# Patient Record
Sex: FEMALE | Race: WHITE | NOT HISPANIC OR LATINO | URBAN - METROPOLITAN AREA
[De-identification: names, ages, dates, MRNs, and addresses within clinical notes are randomized per-mention and may not be internally consistent; named-entity substitution may affect disease eponyms.]

---

## 2019-12-17 PROBLEM — Z00.00 ENCOUNTER FOR PREVENTIVE HEALTH EXAMINATION: Status: ACTIVE | Noted: 2019-12-17

## 2021-07-16 ENCOUNTER — OUTPATIENT (OUTPATIENT)
Dept: OUTPATIENT SERVICES | Facility: HOSPITAL | Age: 61
LOS: 1 days | Discharge: HOME | End: 2021-07-16
Payer: COMMERCIAL

## 2021-07-16 VITALS
TEMPERATURE: 99 F | SYSTOLIC BLOOD PRESSURE: 138 MMHG | HEART RATE: 83 BPM | DIASTOLIC BLOOD PRESSURE: 78 MMHG | HEIGHT: 67 IN | RESPIRATION RATE: 16 BRPM | WEIGHT: 171.96 LBS | OXYGEN SATURATION: 99 %

## 2021-07-16 DIAGNOSIS — N83.209 UNSPECIFIED OVARIAN CYST, UNSPECIFIED SIDE: ICD-10-CM

## 2021-07-16 DIAGNOSIS — Z01.818 ENCOUNTER FOR OTHER PREPROCEDURAL EXAMINATION: ICD-10-CM

## 2021-07-16 DIAGNOSIS — R10.2 PELVIC AND PERINEAL PAIN: ICD-10-CM

## 2021-07-16 LAB
A1C WITH ESTIMATED AVERAGE GLUCOSE RESULT: 5.9 % — HIGH (ref 4–5.6)
ALBUMIN SERPL ELPH-MCNC: 5.1 G/DL — SIGNIFICANT CHANGE UP (ref 3.5–5.2)
ALP SERPL-CCNC: 63 U/L — SIGNIFICANT CHANGE UP (ref 30–115)
ALT FLD-CCNC: 14 U/L — SIGNIFICANT CHANGE UP (ref 0–41)
ANION GAP SERPL CALC-SCNC: 12 MMOL/L — SIGNIFICANT CHANGE UP (ref 7–14)
APTT BLD: 28.5 SEC — SIGNIFICANT CHANGE UP (ref 27–39.2)
AST SERPL-CCNC: 17 U/L — SIGNIFICANT CHANGE UP (ref 0–41)
BASOPHILS # BLD AUTO: 0.04 K/UL — SIGNIFICANT CHANGE UP (ref 0–0.2)
BASOPHILS NFR BLD AUTO: 0.6 % — SIGNIFICANT CHANGE UP (ref 0–1)
BILIRUB SERPL-MCNC: 0.8 MG/DL — SIGNIFICANT CHANGE UP (ref 0.2–1.2)
BLD GP AB SCN SERPL QL: SIGNIFICANT CHANGE UP
BUN SERPL-MCNC: 17 MG/DL — SIGNIFICANT CHANGE UP (ref 10–20)
CALCIUM SERPL-MCNC: 10.1 MG/DL — SIGNIFICANT CHANGE UP (ref 8.5–10.1)
CHLORIDE SERPL-SCNC: 101 MMOL/L — SIGNIFICANT CHANGE UP (ref 98–110)
CO2 SERPL-SCNC: 27 MMOL/L — SIGNIFICANT CHANGE UP (ref 17–32)
CREAT SERPL-MCNC: 0.8 MG/DL — SIGNIFICANT CHANGE UP (ref 0.7–1.5)
EOSINOPHIL # BLD AUTO: 0.13 K/UL — SIGNIFICANT CHANGE UP (ref 0–0.7)
EOSINOPHIL NFR BLD AUTO: 2 % — SIGNIFICANT CHANGE UP (ref 0–8)
ESTIMATED AVERAGE GLUCOSE: 123 MG/DL — HIGH (ref 68–114)
GLUCOSE SERPL-MCNC: 84 MG/DL — SIGNIFICANT CHANGE UP (ref 70–99)
HCT VFR BLD CALC: 40.7 % — SIGNIFICANT CHANGE UP (ref 37–47)
HGB BLD-MCNC: 13.1 G/DL — SIGNIFICANT CHANGE UP (ref 12–16)
IMM GRANULOCYTES NFR BLD AUTO: 0.3 % — SIGNIFICANT CHANGE UP (ref 0.1–0.3)
INR BLD: 1.05 RATIO — SIGNIFICANT CHANGE UP (ref 0.65–1.3)
LYMPHOCYTES # BLD AUTO: 2.19 K/UL — SIGNIFICANT CHANGE UP (ref 1.2–3.4)
LYMPHOCYTES # BLD AUTO: 32.9 % — SIGNIFICANT CHANGE UP (ref 20.5–51.1)
MCHC RBC-ENTMCNC: 29.2 PG — SIGNIFICANT CHANGE UP (ref 27–31)
MCHC RBC-ENTMCNC: 32.2 G/DL — SIGNIFICANT CHANGE UP (ref 32–37)
MCV RBC AUTO: 90.6 FL — SIGNIFICANT CHANGE UP (ref 81–99)
MONOCYTES # BLD AUTO: 0.55 K/UL — SIGNIFICANT CHANGE UP (ref 0.1–0.6)
MONOCYTES NFR BLD AUTO: 8.3 % — SIGNIFICANT CHANGE UP (ref 1.7–9.3)
NEUTROPHILS # BLD AUTO: 3.73 K/UL — SIGNIFICANT CHANGE UP (ref 1.4–6.5)
NEUTROPHILS NFR BLD AUTO: 55.9 % — SIGNIFICANT CHANGE UP (ref 42.2–75.2)
NRBC # BLD: 0 /100 WBCS — SIGNIFICANT CHANGE UP (ref 0–0)
PLATELET # BLD AUTO: 326 K/UL — SIGNIFICANT CHANGE UP (ref 130–400)
POTASSIUM SERPL-MCNC: 4.3 MMOL/L — SIGNIFICANT CHANGE UP (ref 3.5–5)
POTASSIUM SERPL-SCNC: 4.3 MMOL/L — SIGNIFICANT CHANGE UP (ref 3.5–5)
PROT SERPL-MCNC: 7.6 G/DL — SIGNIFICANT CHANGE UP (ref 6–8)
PROTHROM AB SERPL-ACNC: 12.1 SEC — SIGNIFICANT CHANGE UP (ref 9.95–12.87)
RBC # BLD: 4.49 M/UL — SIGNIFICANT CHANGE UP (ref 4.2–5.4)
RBC # FLD: 12.6 % — SIGNIFICANT CHANGE UP (ref 11.5–14.5)
SODIUM SERPL-SCNC: 140 MMOL/L — SIGNIFICANT CHANGE UP (ref 135–146)
WBC # BLD: 6.66 K/UL — SIGNIFICANT CHANGE UP (ref 4.8–10.8)
WBC # FLD AUTO: 6.66 K/UL — SIGNIFICANT CHANGE UP (ref 4.8–10.8)

## 2021-07-16 PROCEDURE — 93010 ELECTROCARDIOGRAM REPORT: CPT

## 2021-07-16 NOTE — H&P PST ADULT - REASON FOR ADMISSION
62 y/o female at PAST to prepare for TOTAL ABDOMINAL HYSTERECTOMY BILAT SALPINGOOOPHORECTOMY BY  Dr Gomes on 8/6/21 at [facility] under general anesthesia.

## 2021-07-16 NOTE — H&P PST ADULT - HISTORY OF PRESENT ILLNESS
62 y/o female at PAST to prepare for TOTAL ABDOMINAL HYSTERECTOMY BILAT SALPINGOOOPHORECTOMY BY  Dr Gomes on 8/6/21 at [facility] under general anesthesia.  Pt had low back pain and had MRI and was found to have pelvic cysts. Followed by GYN who recommended above procedure. Pt denies vaginal bleeding cramping or pain. She had LBP was treated with injections and oral steroids with improvement.  Pt without symptoms. Pt overall feels well.    Pt denies chest pain, palpitations, shortness of breath, dyspnea, or dysuria. Pt exercise tolerance: 2 blocks/ flights of stairs without SOB.  The patient  denies any covid signs or symptoms, denies  recent exposure and denies  testing  positive in the past. She has completed 2 vaccines at work  The patient was advised to self quarantine from now and following pre op covid test until  day of procedure.    Anesthesia Alert  NO--Difficult Airway  NO--History of neck surgery or radiation  NO--Limited ROM of neck  NO--History of Malignant hyperthermia  NO--No personal or family history of Pseudocholinesterase deficiency.  NO--Prior Anesthesia Complication  NO--Latex Allergy  NO--Loose teeth  NO--History of Rheumatoid Arthritis  NO--GINI  NO--Bleeding risk_____

## 2021-07-16 NOTE — H&P PST ADULT - NSICDXFAMILYHX_GEN_ALL_CORE_FT
FAMILY HISTORY:  Father  Still living? Unknown  FH: Parkinson's disease, Age at diagnosis: Age Unknown    Mother  Still living? Unknown  Family history of CVA, Age at diagnosis: Age Unknown  FH: colon cancer, Age at diagnosis: Age Unknown

## 2021-08-03 ENCOUNTER — OUTPATIENT (OUTPATIENT)
Dept: OUTPATIENT SERVICES | Facility: HOSPITAL | Age: 61
LOS: 1 days | Discharge: HOME | End: 2021-08-03

## 2021-08-03 DIAGNOSIS — Z11.59 ENCOUNTER FOR SCREENING FOR OTHER VIRAL DISEASES: ICD-10-CM

## 2021-08-03 PROBLEM — M51.26 OTHER INTERVERTEBRAL DISC DISPLACEMENT, LUMBAR REGION: Chronic | Status: ACTIVE | Noted: 2021-07-16

## 2021-08-03 PROBLEM — J30.2 OTHER SEASONAL ALLERGIC RHINITIS: Chronic | Status: ACTIVE | Noted: 2021-07-16

## 2021-08-06 ENCOUNTER — INPATIENT (INPATIENT)
Facility: HOSPITAL | Age: 61
LOS: 1 days | Discharge: HOME | End: 2021-08-08
Attending: OBSTETRICS & GYNECOLOGY | Admitting: OBSTETRICS & GYNECOLOGY
Payer: COMMERCIAL

## 2021-08-06 ENCOUNTER — RESULT REVIEW (OUTPATIENT)
Age: 61
End: 2021-08-06

## 2021-08-06 VITALS
SYSTOLIC BLOOD PRESSURE: 134 MMHG | HEIGHT: 67 IN | WEIGHT: 171.96 LBS | OXYGEN SATURATION: 100 % | RESPIRATION RATE: 18 BRPM | HEART RATE: 83 BPM | DIASTOLIC BLOOD PRESSURE: 71 MMHG | TEMPERATURE: 99 F

## 2021-08-06 LAB
ABO RH CONFIRMATION: SIGNIFICANT CHANGE UP
GLUCOSE BLDC GLUCOMTR-MCNC: 185 MG/DL — HIGH (ref 70–99)
GLUCOSE BLDC GLUCOMTR-MCNC: 93 MG/DL — SIGNIFICANT CHANGE UP (ref 70–99)

## 2021-08-06 PROCEDURE — 88305 TISSUE EXAM BY PATHOLOGIST: CPT | Mod: 26

## 2021-08-06 PROCEDURE — 88307 TISSUE EXAM BY PATHOLOGIST: CPT | Mod: 26

## 2021-08-06 PROCEDURE — 58720 REMOVAL OF OVARY/TUBE(S): CPT

## 2021-08-06 PROCEDURE — 88305 TISSUE EXAM BY PATHOLOGIST: CPT | Mod: 26,59

## 2021-08-06 PROCEDURE — 88112 CYTOPATH CELL ENHANCE TECH: CPT | Mod: 26

## 2021-08-06 RX ORDER — ENOXAPARIN SODIUM 100 MG/ML
40 INJECTION SUBCUTANEOUS DAILY
Refills: 0 | Status: DISCONTINUED | OUTPATIENT
Start: 2021-08-06 | End: 2021-08-08

## 2021-08-06 RX ORDER — HYDROMORPHONE HYDROCHLORIDE 2 MG/ML
30 INJECTION INTRAMUSCULAR; INTRAVENOUS; SUBCUTANEOUS
Refills: 0 | Status: DISCONTINUED | OUTPATIENT
Start: 2021-08-06 | End: 2021-08-07

## 2021-08-06 RX ORDER — SIMETHICONE 80 MG/1
80 TABLET, CHEWABLE ORAL EVERY 6 HOURS
Refills: 0 | Status: DISCONTINUED | OUTPATIENT
Start: 2021-08-06 | End: 2021-08-08

## 2021-08-06 RX ORDER — HYDROMORPHONE HYDROCHLORIDE 2 MG/ML
0.5 INJECTION INTRAMUSCULAR; INTRAVENOUS; SUBCUTANEOUS
Refills: 0 | Status: DISCONTINUED | OUTPATIENT
Start: 2021-08-06 | End: 2021-08-06

## 2021-08-06 RX ORDER — ONDANSETRON 8 MG/1
8 TABLET, FILM COATED ORAL EVERY 8 HOURS
Refills: 0 | Status: DISCONTINUED | OUTPATIENT
Start: 2021-08-06 | End: 2021-08-08

## 2021-08-06 RX ORDER — ACETAMINOPHEN 500 MG
1000 TABLET ORAL EVERY 6 HOURS
Refills: 0 | Status: DISCONTINUED | OUTPATIENT
Start: 2021-08-06 | End: 2021-08-08

## 2021-08-06 RX ORDER — ONDANSETRON 8 MG/1
4 TABLET, FILM COATED ORAL ONCE
Refills: 0 | Status: COMPLETED | OUTPATIENT
Start: 2021-08-06 | End: 2021-08-06

## 2021-08-06 RX ORDER — KETOROLAC TROMETHAMINE 30 MG/ML
30 SYRINGE (ML) INJECTION EVERY 8 HOURS
Refills: 0 | Status: DISCONTINUED | OUTPATIENT
Start: 2021-08-06 | End: 2021-08-07

## 2021-08-06 RX ORDER — IBUPROFEN 200 MG
600 TABLET ORAL EVERY 6 HOURS
Refills: 0 | Status: COMPLETED | OUTPATIENT
Start: 2021-08-06 | End: 2022-07-05

## 2021-08-06 RX ORDER — GABAPENTIN 400 MG/1
300 CAPSULE ORAL EVERY 8 HOURS
Refills: 0 | Status: DISCONTINUED | OUTPATIENT
Start: 2021-08-06 | End: 2021-08-08

## 2021-08-06 RX ORDER — ACETAMINOPHEN 500 MG
1000 TABLET ORAL EVERY 6 HOURS
Refills: 0 | Status: COMPLETED | OUTPATIENT
Start: 2021-08-06 | End: 2022-07-05

## 2021-08-06 RX ORDER — SENNA PLUS 8.6 MG/1
1 TABLET ORAL AT BEDTIME
Refills: 0 | Status: DISCONTINUED | OUTPATIENT
Start: 2021-08-06 | End: 2021-08-08

## 2021-08-06 RX ORDER — CEFAZOLIN SODIUM 1 G
2000 VIAL (EA) INJECTION EVERY 8 HOURS
Refills: 0 | Status: COMPLETED | OUTPATIENT
Start: 2021-08-06 | End: 2021-08-07

## 2021-08-06 RX ORDER — HYDROMORPHONE HYDROCHLORIDE 2 MG/ML
1 INJECTION INTRAMUSCULAR; INTRAVENOUS; SUBCUTANEOUS
Refills: 0 | Status: DISCONTINUED | OUTPATIENT
Start: 2021-08-06 | End: 2021-08-06

## 2021-08-06 RX ORDER — OXYCODONE HYDROCHLORIDE 5 MG/1
5 TABLET ORAL
Refills: 0 | Status: DISCONTINUED | OUTPATIENT
Start: 2021-08-06 | End: 2021-08-06

## 2021-08-06 RX ORDER — SODIUM CHLORIDE 9 MG/ML
1000 INJECTION, SOLUTION INTRAVENOUS
Refills: 0 | Status: DISCONTINUED | OUTPATIENT
Start: 2021-08-06 | End: 2021-08-06

## 2021-08-06 RX ADMIN — Medication 100 MILLIGRAM(S): at 16:00

## 2021-08-06 RX ADMIN — ENOXAPARIN SODIUM 40 MILLIGRAM(S): 100 INJECTION SUBCUTANEOUS at 21:10

## 2021-08-06 RX ADMIN — HYDROMORPHONE HYDROCHLORIDE 1 MILLIGRAM(S): 2 INJECTION INTRAMUSCULAR; INTRAVENOUS; SUBCUTANEOUS at 14:46

## 2021-08-06 RX ADMIN — GABAPENTIN 300 MILLIGRAM(S): 400 CAPSULE ORAL at 21:10

## 2021-08-06 RX ADMIN — Medication 1000 MILLIGRAM(S): at 17:03

## 2021-08-06 RX ADMIN — HYDROMORPHONE HYDROCHLORIDE 1 MILLIGRAM(S): 2 INJECTION INTRAMUSCULAR; INTRAVENOUS; SUBCUTANEOUS at 16:13

## 2021-08-06 RX ADMIN — HYDROMORPHONE HYDROCHLORIDE 30 MILLILITER(S): 2 INJECTION INTRAMUSCULAR; INTRAVENOUS; SUBCUTANEOUS at 15:43

## 2021-08-06 RX ADMIN — Medication 100 MILLIGRAM(S): at 23:28

## 2021-08-06 RX ADMIN — ONDANSETRON 4 MILLIGRAM(S): 8 TABLET, FILM COATED ORAL at 14:46

## 2021-08-06 RX ADMIN — SODIUM CHLORIDE 75 MILLILITER(S): 9 INJECTION, SOLUTION INTRAVENOUS at 14:56

## 2021-08-06 NOTE — ASU PREOP CHECKLIST - AICD PRESENT
no

## 2021-08-06 NOTE — BRIEF OPERATIVE NOTE - NSICDXBRIEFPOSTOP_GEN_ALL_CORE_FT
POST-OP DIAGNOSIS:  Left ovarian cyst 06-Aug-2021 14:45:33 frozen section showed endometrioma, final pathology pending Aliya Andino

## 2021-08-06 NOTE — BRIEF OPERATIVE NOTE - OPERATION/FINDINGS
Exam under anesthesia normal external female genitalia. Uterus small, anteverted and mobile. 10x8cm ovarian cystic mass, multiloculated and adherent to left pelvic sidewall. 2mm nodule on peritoneum left pelvic sidewall. Normal appearing right fallopian tube and ovary. Exam under anesthesia normal external female genitalia. Uterus small, anteverted and mobile. 10x8cm ovarian cystic mass, multiloculated and adherent to left pelvic sidewall. 2mm nodule on peritoneum left pelvic sidewall. Normal appearing right fallopian tube and ovary. Intraop Gyn Onc consult given left adnexa adherent to pelvic sidewall. Exam under anesthesia normal external female genitalia. Uterus small, anteverted and mobile. 10x8cm ovarian cystic mass, multiloculated and adherent to left pelvic sidewall. 2mm nodule on peritoneum left pelvic sidewall. Normal appearing right fallopian tube and ovary. Intraop Gyn Onc consult (Dr. Tristan) given left adnexa adherent to pelvic sidewall.

## 2021-08-06 NOTE — CHART NOTE - NSCHARTNOTEFT_GEN_A_CORE
PACU ANESTHESIA ADMISSION NOTE      Procedure: total abdominal hystrectomy  Post op diagnosis:  Ovarian mass    ____  Intubated  TV:______       Rate: ______      FiO2: ______    _x___  Patent Airway    _x___  Full return of protective reflexes    _x___  Full recovery from anesthesia / back to baseline status    Vitals  SPO2:-100% on 2l   HR:-98  RR:-12  B.P:-122/85  TEMP:-99.9    Mental Status:  _x___ Awake   ___x_ Alert   _____ Drowsy   _____ Sedated    Nausea/Vomiting:  _x___  NO       ______Yes,   See Post - Op Orders         Pain Scale (0-10):  __0___    Treatment: _x___ None    __x__ See Post - Op/PCA Orders    Post - Operative Fluids:   ___ Oral   ____x See Post - Op Orders    Plan: Discharge:   ___Home       ___x__Floor     _____Critical Care    _____  Other:_________________    Comments:  Report endorsed to RN in pacu  Vitals stable  No anesthesia issues or complications noted.  Discharge to patient to floor when criteria met.

## 2021-08-06 NOTE — PROGRESS NOTE ADULT - ASSESSMENT
60 yo G0, w/ 10cm left ovarian mass, s/p KRISTEN/BSO, s/p intraop consult by gynonc for excision of left pelvic sidewall nodule, frozen endometrioma, EBL 100cc, POD0, recovering well.    - pain management with dilaudid PCA + ERAS protocol  - monitor UO, remove garcia in AM  - monitor vitals   - encourage ambulation after PCA discontinued, incentive spirometry  - clear liquid diet, IV hydration  - SCDs and lovenox for DVT ppx  - ancef x24hrs postop  - AM labs ordered    Dr. Gomes aware.

## 2021-08-06 NOTE — PROGRESS NOTE ADULT - SUBJECTIVE AND OBJECTIVE BOX
PGY 4 Progress Note    Subjective: Patient seen and examined at bedside. Doing well, no complaints. No significant abdominal pain or vaginal bleeding. Denies fever, chills, CP, SOB, N/V, LE pain. She has not yet ambulated, no flatus or BM, tolerating clear liquids.     Physical exam:    Vital Signs Last 24 Hrs  T(F): 97.9 (06 Aug 2021 21:07), Max: 100.1 (06 Aug 2021 14:15)  HR: 87 (06 Aug 2021 21:07) (80 - 111)  BP: 113/68 (06 Aug 2021 21:07) (113/68 - 134/71)  RR: 16 (06 Aug 2021 21:07) (9 - 18)  SpO2: 97% (06 Aug 2021 21:07) (95% - 100%)    Gen: NAD  CVS: s1s2, rrr  Lungs: ctab, no r/r/w  Abdomen: Soft, appropriately tender, minimal distension, no r/r/g  Incision: well healing Pfannenstiel skin incision, c/d/i  Pelvic: deferred, no bleeding on nba  Ext: No calf tenderness b/l LE    Diet: clear liquids  meds:   acetaminophen   Tablet ..   1000 milliGRAM(s) Oral (08-06-21 @ 17:03)    ceFAZolin   IVPB   100 mL/Hr IV Intermittent (08-06-21 @ 16:00)    enoxaparin Injectable   40 milliGRAM(s) SubCutaneous (08-06-21 @ 21:10)    gabapentin   300 milliGRAM(s) Oral (08-06-21 @ 21:10)    HYDROmorphone  Injectable   1 milliGRAM(s) IV Push (08-06-21 @ 14:46)    lactated ringers.   75 mL/Hr IV Continuous (08-06-21 @ 14:18)    ondansetron Injectable   4 milliGRAM(s) IV Push (08-06-21 @ 14:46)        LABS:

## 2021-08-06 NOTE — BRIEF OPERATIVE NOTE - SPECIMENS
1. left adnexal with ovarian mass 2. uterus, cervix and right fallopian tube and ovary 3. left pelvic sidewall peritoneal nodule

## 2021-08-07 ENCOUNTER — TRANSCRIPTION ENCOUNTER (OUTPATIENT)
Age: 61
End: 2021-08-07

## 2021-08-07 LAB
ALBUMIN SERPL ELPH-MCNC: 3.7 G/DL — SIGNIFICANT CHANGE UP (ref 3.5–5.2)
ALBUMIN SERPL ELPH-MCNC: 3.8 G/DL — SIGNIFICANT CHANGE UP (ref 3.5–5.2)
ALP SERPL-CCNC: 48 U/L — SIGNIFICANT CHANGE UP (ref 30–115)
ALP SERPL-CCNC: 51 U/L — SIGNIFICANT CHANGE UP (ref 30–115)
ALT FLD-CCNC: 12 U/L — SIGNIFICANT CHANGE UP (ref 0–41)
ALT FLD-CCNC: 14 U/L — SIGNIFICANT CHANGE UP (ref 0–41)
ANION GAP SERPL CALC-SCNC: 14 MMOL/L — SIGNIFICANT CHANGE UP (ref 7–14)
ANION GAP SERPL CALC-SCNC: 7 MMOL/L — SIGNIFICANT CHANGE UP (ref 7–14)
AST SERPL-CCNC: 16 U/L — SIGNIFICANT CHANGE UP (ref 0–41)
AST SERPL-CCNC: 24 U/L — SIGNIFICANT CHANGE UP (ref 0–41)
BILIRUB SERPL-MCNC: 1 MG/DL — SIGNIFICANT CHANGE UP (ref 0.2–1.2)
BILIRUB SERPL-MCNC: 1.4 MG/DL — HIGH (ref 0.2–1.2)
BUN SERPL-MCNC: 11 MG/DL — SIGNIFICANT CHANGE UP (ref 10–20)
BUN SERPL-MCNC: 13 MG/DL — SIGNIFICANT CHANGE UP (ref 10–20)
CALCIUM SERPL-MCNC: 8.8 MG/DL — SIGNIFICANT CHANGE UP (ref 8.5–10.1)
CALCIUM SERPL-MCNC: 9 MG/DL — SIGNIFICANT CHANGE UP (ref 8.5–10.1)
CHLORIDE SERPL-SCNC: 103 MMOL/L — SIGNIFICANT CHANGE UP (ref 98–110)
CHLORIDE SERPL-SCNC: 98 MMOL/L — SIGNIFICANT CHANGE UP (ref 98–110)
CO2 SERPL-SCNC: 22 MMOL/L — SIGNIFICANT CHANGE UP (ref 17–32)
CO2 SERPL-SCNC: 29 MMOL/L — SIGNIFICANT CHANGE UP (ref 17–32)
COVID-19 SPIKE DOMAIN AB INTERP: POSITIVE
COVID-19 SPIKE DOMAIN ANTIBODY RESULT: >250 U/ML — HIGH
CREAT SERPL-MCNC: 0.7 MG/DL — SIGNIFICANT CHANGE UP (ref 0.7–1.5)
CREAT SERPL-MCNC: 0.7 MG/DL — SIGNIFICANT CHANGE UP (ref 0.7–1.5)
GLUCOSE SERPL-MCNC: 120 MG/DL — HIGH (ref 70–99)
GLUCOSE SERPL-MCNC: 99 MG/DL — SIGNIFICANT CHANGE UP (ref 70–99)
HCT VFR BLD CALC: 34.8 % — LOW (ref 37–47)
HGB BLD-MCNC: 10.9 G/DL — LOW (ref 12–16)
MAGNESIUM SERPL-MCNC: 2 MG/DL — SIGNIFICANT CHANGE UP (ref 1.8–2.4)
MCHC RBC-ENTMCNC: 29 PG — SIGNIFICANT CHANGE UP (ref 27–31)
MCHC RBC-ENTMCNC: 31.3 G/DL — LOW (ref 32–37)
MCV RBC AUTO: 92.6 FL — SIGNIFICANT CHANGE UP (ref 81–99)
NRBC # BLD: 0 /100 WBCS — SIGNIFICANT CHANGE UP (ref 0–0)
PHOSPHATE SERPL-MCNC: 4.5 MG/DL — SIGNIFICANT CHANGE UP (ref 2.1–4.9)
PLATELET # BLD AUTO: 276 K/UL — SIGNIFICANT CHANGE UP (ref 130–400)
POTASSIUM SERPL-MCNC: 4.5 MMOL/L — SIGNIFICANT CHANGE UP (ref 3.5–5)
POTASSIUM SERPL-MCNC: 5.1 MMOL/L — HIGH (ref 3.5–5)
POTASSIUM SERPL-SCNC: 4.5 MMOL/L — SIGNIFICANT CHANGE UP (ref 3.5–5)
POTASSIUM SERPL-SCNC: 5.1 MMOL/L — HIGH (ref 3.5–5)
PROT SERPL-MCNC: 5.7 G/DL — LOW (ref 6–8)
PROT SERPL-MCNC: 6 G/DL — SIGNIFICANT CHANGE UP (ref 6–8)
RBC # BLD: 3.76 M/UL — LOW (ref 4.2–5.4)
RBC # FLD: 13 % — SIGNIFICANT CHANGE UP (ref 11.5–14.5)
SARS-COV-2 IGG+IGM SERPL QL IA: >250 U/ML — HIGH
SARS-COV-2 IGG+IGM SERPL QL IA: POSITIVE
SODIUM SERPL-SCNC: 134 MMOL/L — LOW (ref 135–146)
SODIUM SERPL-SCNC: 139 MMOL/L — SIGNIFICANT CHANGE UP (ref 135–146)
WBC # BLD: 10.48 K/UL — SIGNIFICANT CHANGE UP (ref 4.8–10.8)
WBC # FLD AUTO: 10.48 K/UL — SIGNIFICANT CHANGE UP (ref 4.8–10.8)

## 2021-08-07 RX ADMIN — GABAPENTIN 300 MILLIGRAM(S): 400 CAPSULE ORAL at 05:22

## 2021-08-07 RX ADMIN — ENOXAPARIN SODIUM 40 MILLIGRAM(S): 100 INJECTION SUBCUTANEOUS at 12:04

## 2021-08-07 RX ADMIN — Medication 1000 MILLIGRAM(S): at 17:41

## 2021-08-07 RX ADMIN — Medication 30 MILLIGRAM(S): at 05:22

## 2021-08-07 RX ADMIN — Medication 30 MILLIGRAM(S): at 14:23

## 2021-08-07 RX ADMIN — GABAPENTIN 300 MILLIGRAM(S): 400 CAPSULE ORAL at 21:26

## 2021-08-07 RX ADMIN — Medication 1000 MILLIGRAM(S): at 12:04

## 2021-08-07 RX ADMIN — GABAPENTIN 300 MILLIGRAM(S): 400 CAPSULE ORAL at 14:23

## 2021-08-07 RX ADMIN — Medication 100 MILLIGRAM(S): at 10:33

## 2021-08-07 RX ADMIN — Medication 1000 MILLIGRAM(S): at 23:10

## 2021-08-07 RX ADMIN — Medication 1000 MILLIGRAM(S): at 05:22

## 2021-08-07 NOTE — PROGRESS NOTE ADULT - SUBJECTIVE AND OBJECTIVE BOX
PGY 3 Note    Patient examined at bedside, pain well controlled on PO/IV medications.  Denies fevers/chills, HA/N/V, CP/SOB/palpitations, abdominal pain, vaginal bleeding, hematuria/dysuria, constipation/diarrhea. Tolerating clear/full/regular diet, passing/no flatus. Not Ambulating. Using incentive spirometry.     T(F): 98.6 (08-07-21 @ 05:00), Max: 100.1 (08-06-21 @ 14:15)  HR: 84 (08-07-21 @ 05:00) (80 - 111)  BP: 114/61 (08-07-21 @ 05:00) (105/56 - 133/70)  RR: 18 (08-07-21 @ 05:00) (9 - 18)  SpO2: 98% (08-07-21 @ 05:00) (95% - 100%)    I&O's Summary    06 Aug 2021 07:01  -  07 Aug 2021 07:00  --------------------------------------------------------  IN: 665 mL / OUT: 1115 mL / NET: -450 mL      Urine:   08-06-21 @ 07:01  -  08-07-21 @ 07:00  --------------------------------------------------------  IN: 0 mL / OUT: 585 mL / NET: -585 mL      NG Tube:     Drain:     CAPILLARY BLOOD GLUCOSE      POCT Blood Glucose.: 185 mg/dL (06 Aug 2021 14:54)      Physical Exam:  General: AAOx3. NAD  CVS: RRR. Nl S1S2  Lungs: CTAB  Abdomen: soft, non-tender, non-distended, +BSx4  Incision: C/D/I, no erythema, no draining  VE: deferred, no bleeding on pad/chux  Ext: No edema. SCDs in place    Labs:             10.9<L>  10.48 )-----------( 276      ( 08-07 @ 04:58 )             34.8<L>     08-07    139  |  103  |  11  ----------------------------<  120<H>  5.1<H>   |  29  |  0.7    Ca    9.0      07 Aug 2021 04:58  Phos  4.5     08-07  Mg     2.0     08-07    TPro  5.7<L>  /  Alb  3.7  /  TBili  1.0  /  DBili  x   /  AST  16  /  ALT  12  /  AlkPhos  48  08-07            Trend:             10.9<L>  10.48 )-----------( 276      ( 08-07 @ 04:58 )             34.8<L>        Creatinine, Serum: 0.7 (08-07)      Medications:  MEDICATIONS  (STANDING):  acetaminophen   Tablet .. 1000 milliGRAM(s) Oral every 6 hours  ceFAZolin   IVPB 2000 milliGRAM(s) IV Intermittent every 8 hours  enoxaparin Injectable 40 milliGRAM(s) SubCutaneous daily  gabapentin 300 milliGRAM(s) Oral every 8 hours  HYDROmorphone PCA (1 mG/mL) 30 milliLiter(s) PCA Continuous PCA Continuous  ketorolac   Injectable 30 milliGRAM(s) IV Push every 8 hours    MEDICATIONS  (PRN):  acetaminophen   Tablet .. 1000 milliGRAM(s) Oral every 6 hours PRN Mild Pain (1 - 3)  ibuprofen  Tablet. 600 milliGRAM(s) Oral every 6 hours PRN Mild Pain (1 - 3)  ondansetron    Tablet 8 milliGRAM(s) Oral every 8 hours PRN Nausea and/or Vomiting  senna 1 Tablet(s) Oral at bedtime PRN Constipation  simethicone 80 milliGRAM(s) Chew every 6 hours PRN Gas      A/P:  61y  POD   GI:  DVT prophylaxis:  Neuro:  Cardio:   Pulm:   ID:  Endo:      Will inform        PGY 3 Note    Patient examined at bedside, pain well controlled on dilaudid PCA. Reports pain near her abdominal incision when moving. Denies fevers/chills, HA/N/V, CP/SOB/palpitations, vaginal bleeding, hematuria/dysuria, constipation/diarrhea. Tolerating clear diet, passing no flatus. Not Ambulating. Using incentive spirometry.     T(F): 98.6 (08-07-21 @ 05:00), Max: 100.1 (08-06-21 @ 14:15)  HR: 84 (08-07-21 @ 05:00) (80 - 111)  BP: 114/61 (08-07-21 @ 05:00) (105/56 - 133/70)  RR: 18 (08-07-21 @ 05:00) (9 - 18)  SpO2: 98% (08-07-21 @ 05:00) (95% - 100%)    I&O's Summary    06 Aug 2021 07:01  -  07 Aug 2021 07:00  --------------------------------------------------------  IN: 665 mL / OUT: 1115 mL / NET: -450 mL      Urine:   08-06-21 @ 07:01  -  08-07-21 @ 07:00  --------------------------------------------------------  IN: 0 mL / OUT: 585 mL / NET: -585 mL      UO: 300cc (7580-2520), adequate      POCT Blood Glucose.: 185 mg/dL (06 Aug 2021 14:54)      Physical Exam:  General: AAOx3. NAD  CVS: RRR. Nl S1S2  Lungs: CTAB  Abdomen: soft, non-tender, non-distended, +BSx4  Incision: pfannenstial incision C/D/I with steri-stripes in place, no erythema, no draining  VE: deferred, no bleeding on pad/chux  Ext: No edema. SCDs in place    Labs:             10.9<L>  10.48 )-----------( 276      ( 08-07 @ 04:58 )             34.8<L>     08-07    139  |  103  |  11  ----------------------------<  120<H>  5.1<H>   |  29  |  0.7    Ca    9.0      07 Aug 2021 04:58  Phos  4.5     08-07  Mg     2.0     08-07    TPro  5.7<L>  /  Alb  3.7  /  TBili  1.0  /  DBili  x   /  AST  16  /  ALT  12  /  AlkPhos  48  08-07            Trend:             10.9<L>  10.48 )-----------( 276      ( 08-07 @ 04:58 )             34.8<L>        Creatinine, Serum: 0.7 (08-07)      Medications:  MEDICATIONS  (STANDING):  acetaminophen   Tablet .. 1000 milliGRAM(s) Oral every 6 hours  ceFAZolin   IVPB 2000 milliGRAM(s) IV Intermittent every 8 hours  enoxaparin Injectable 40 milliGRAM(s) SubCutaneous daily  gabapentin 300 milliGRAM(s) Oral every 8 hours  HYDROmorphone PCA (1 mG/mL) 30 milliLiter(s) PCA Continuous PCA Continuous  ketorolac   Injectable 30 milliGRAM(s) IV Push every 8 hours    MEDICATIONS  (PRN):  acetaminophen   Tablet .. 1000 milliGRAM(s) Oral every 6 hours PRN Mild Pain (1 - 3)  ibuprofen  Tablet. 600 milliGRAM(s) Oral every 6 hours PRN Mild Pain (1 - 3)  ondansetron    Tablet 8 milliGRAM(s) Oral every 8 hours PRN Nausea and/or Vomiting  senna 1 Tablet(s) Oral at bedtime PRN Constipation  simethicone 80 milliGRAM(s) Chew every 6 hours PRN Gas      A/P:  61y  POD   GI:  DVT prophylaxis:  Neuro:  Cardio:   Pulm:   ID:  Endo:      Will inform        PGY 3 Note    Patient examined at bedside, pain well controlled on dilaudid PCA. Reports pain near her abdominal incision when moving. Denies fevers/chills, HA/N/V, CP/SOB/palpitations, vaginal bleeding, hematuria/dysuria, constipation/diarrhea. Tolerating clear diet, passing no flatus. Not Ambulating. Using incentive spirometry.     T(F): 98.6 (08-07-21 @ 05:00), Max: 100.1 (08-06-21 @ 14:15)  HR: 84 (08-07-21 @ 05:00) (80 - 111)  BP: 114/61 (08-07-21 @ 05:00) (105/56 - 133/70)  RR: 18 (08-07-21 @ 05:00) (9 - 18)  SpO2: 98% (08-07-21 @ 05:00) (95% - 100%)    I&O's Summary    06 Aug 2021 07:01  -  07 Aug 2021 07:00  --------------------------------------------------------  IN: 665 mL / OUT: 1115 mL / NET: -450 mL      Urine:   08-06-21 @ 07:01  -  08-07-21 @ 07:00  --------------------------------------------------------  IN: 0 mL / OUT: 585 mL / NET: -585 mL      UO: 300cc (2850-7583), adequate      POCT Blood Glucose.: 185 mg/dL (06 Aug 2021 14:54)      Physical Exam:  General: AAOx3. NAD  CVS: RRR. Nl S1S2  Lungs: CTAB  Abdomen: soft, non-tender, non-distended, +BSx4  Incision: pfannenstial incision C/D/I with steri-stripes in place, no erythema, no draining  VE: deferred, no bleeding on pad/chux  Ext: No edema. SCDs in place    Labs:             10.9<L>  10.48 )-----------( 276      ( 08-07 @ 04:58 )             34.8<L>     08-07    139  |  103  |  11  ----------------------------<  120<H>  5.1<H>   |  29  |  0.7    Ca    9.0      07 Aug 2021 04:58  Phos  4.5     08-07  Mg     2.0     08-07    TPro  5.7<L>  /  Alb  3.7  /  TBili  1.0  /  DBili  x   /  AST  16  /  ALT  12  /  AlkPhos  48  08-07            Trend:             10.9<L>  10.48 )-----------( 276      ( 08-07 @ 04:58 )             34.8<L>        Creatinine, Serum: 0.7 (08-07)      Medications:  MEDICATIONS  (STANDING):  acetaminophen   Tablet .. 1000 milliGRAM(s) Oral every 6 hours  ceFAZolin   IVPB 2000 milliGRAM(s) IV Intermittent every 8 hours  enoxaparin Injectable 40 milliGRAM(s) SubCutaneous daily  gabapentin 300 milliGRAM(s) Oral every 8 hours  HYDROmorphone PCA (1 mG/mL) 30 milliLiter(s) PCA Continuous PCA Continuous  ketorolac   Injectable 30 milliGRAM(s) IV Push every 8 hours    MEDICATIONS  (PRN):  acetaminophen   Tablet .. 1000 milliGRAM(s) Oral every 6 hours PRN Mild Pain (1 - 3)  ibuprofen  Tablet. 600 milliGRAM(s) Oral every 6 hours PRN Mild Pain (1 - 3)  ondansetron    Tablet 8 milliGRAM(s) Oral every 8 hours PRN Nausea and/or Vomiting  senna 1 Tablet(s) Oral at bedtime PRN Constipation  simethicone 80 milliGRAM(s) Chew every 6 hours PRN Gas            Will inform

## 2021-08-07 NOTE — PROGRESS NOTE ADULT - ASSESSMENT
INCOMPLETE        Dr. Gomes aware A/P: 60 yo G0 POD#1 s/p KRISTEN-BSO for 10cm left ovarian mass with intraop consult by gynonc for excision of left pelvic sidewall nodule with an EBL of 100cc, frozen pathology demonstrated endometrioma, recovering well.    - pain management prn. Will discontinue dilaudid PCA   - monitor UO, remove garcia in AM  - monitor vitals   - encourage ambulation after PCA discontinued, incentive spirometry  - clear liquid diet, IV hydration  - SCDs and lovenox for DVT ppx  - ancef x24hrs postop  - AM labs ordered    Dr. Gomes aware.         Dr. Gomes aware A/P: 62 yo G0 POD#1 s/p KRISTEN-BSO for 10cm left ovarian mass with intraop consult by gynonc for excision of left pelvic sidewall nodule with an EBL of 100cc, frozen pathology demonstrated endometrioma, recovering well.  -diet: clear liquid diet. Will advance with flatus   -DVT ppx: SCDs & lovenox   -indwelling urethral catheter discontinued. TOV by 1430  -continue IVF hydration until successful trial of void   -activity: ambulate as tolerated  -pain management prn. Will discontinue dilaudid PCA   -encourage ambulation  -encourage PO hydration   -encourage incentive spirometry use  -continue home medications  -f/u AM labs   -will continue ancef for 24 hours post-operative       Dr. Gomes aware

## 2021-08-08 ENCOUNTER — TRANSCRIPTION ENCOUNTER (OUTPATIENT)
Age: 61
End: 2021-08-08

## 2021-08-08 VITALS
RESPIRATION RATE: 16 BRPM | TEMPERATURE: 98 F | SYSTOLIC BLOOD PRESSURE: 125 MMHG | OXYGEN SATURATION: 98 % | HEART RATE: 101 BPM | DIASTOLIC BLOOD PRESSURE: 75 MMHG

## 2021-08-08 RX ORDER — IBUPROFEN 200 MG
600 TABLET ORAL EVERY 6 HOURS
Refills: 0 | Status: DISCONTINUED | OUTPATIENT
Start: 2021-08-08 | End: 2021-08-08

## 2021-08-08 RX ORDER — CHOLECALCIFEROL (VITAMIN D3) 125 MCG
0 CAPSULE ORAL
Qty: 0 | Refills: 0 | DISCHARGE

## 2021-08-08 RX ORDER — ACETAMINOPHEN 500 MG
1000 TABLET ORAL EVERY 6 HOURS
Refills: 0 | Status: DISCONTINUED | OUTPATIENT
Start: 2021-08-08 | End: 2021-08-08

## 2021-08-08 RX ORDER — CETIRIZINE HYDROCHLORIDE 10 MG/1
1 TABLET ORAL
Qty: 0 | Refills: 0 | DISCHARGE

## 2021-08-08 RX ORDER — IBUPROFEN 200 MG
1 TABLET ORAL
Qty: 0 | Refills: 0 | DISCHARGE
Start: 2021-08-08

## 2021-08-08 RX ORDER — ACETAMINOPHEN 500 MG
2 TABLET ORAL
Qty: 0 | Refills: 0 | DISCHARGE
Start: 2021-08-08

## 2021-08-08 RX ADMIN — Medication 600 MILLIGRAM(S): at 05:24

## 2021-08-08 RX ADMIN — Medication 1000 MILLIGRAM(S): at 05:45

## 2021-08-08 RX ADMIN — GABAPENTIN 300 MILLIGRAM(S): 400 CAPSULE ORAL at 05:25

## 2021-08-08 RX ADMIN — Medication 500 MILLIGRAM(S): at 11:32

## 2021-08-08 RX ADMIN — Medication 1000 MILLIGRAM(S): at 00:00

## 2021-08-08 RX ADMIN — Medication 600 MILLIGRAM(S): at 04:02

## 2021-08-08 RX ADMIN — Medication 1000 MILLIGRAM(S): at 05:25

## 2021-08-08 NOTE — PROVIDER CONTACT NOTE (MEDICATION) - ASSESSMENT
Patient O2 level @ 84-86% this morning. Patient typically sating around 98% Patient O2 level @ 84-86% this morning. Patient typically sating around 98%. No signs of distress or discomfort. Patient denies pain. No SOB or labored breathing.

## 2021-08-08 NOTE — DISCHARGE NOTE NURSING/CASE MANAGEMENT/SOCIAL WORK - PATIENT PORTAL LINK FT
You can access the FollowMyHealth Patient Portal offered by [facility] by registering at the following website: [email or web address]. By joining [facility] FollowMyHealth portal, you will also be able to view your health information using other applications (apps) compatible with our system.

## 2021-08-08 NOTE — DISCHARGE NOTE PROVIDER - HOSPITAL COURSE
60yo G0 [id number] left ovarian mass s/p KRISTEN with BSO. Frozen demonstrated an endometrioma. Pt voided, tolerated regular diet, passed flatus, and ambulated. Pt met milestones to be discharged home.

## 2021-08-08 NOTE — PROGRESS NOTE ADULT - ASSESSMENT
A/P: 60 yo G0 POD#2 s/p KRISTEN-BSO for 10cm left ovarian mass with intraop consult by gynonc for excision of left pelvic sidewall nodule with an EBL of 100cc, frozen pathology demonstrated endometrioma, recovering well.  -diet: regular  -DVT ppx: SCDs & lovenox   -continue IVF hydration until successful trial of void   -activity: ambulate as tolerated  -pain management prn  -encourage ambulation  -encourage PO hydration   -encourage incentive spirometry use  -anticipate dc home today      Dr. Gomes aware       A/P: 62 yo G0 POD#2 s/p KRISTEN-BSO for 10cm left ovarian mass with intraop consult by gynonc for excision of left pelvic sidewall nodule with an EBL of 100cc, frozen pathology demonstrated endometrioma, recovering well.  -diet: regular  -DVT ppx: SCDs & lovenox   -activity: ambulate as tolerated  -pain management prn  -encourage ambulation  -encourage PO hydration   -encourage incentive spirometry use  -anticipate dc home today      Dr. Gomes aware

## 2021-08-08 NOTE — DISCHARGE NOTE PROVIDER - NSDCMRMEDTOKEN_GEN_ALL_CORE_FT
Vitamin D3: 50,000 one/ wk  ZyrTEC 10 mg oral tablet: 1 tab(s) orally once a day   acetaminophen 500 mg oral tablet: 2 tab(s) orally every 6 hours  ibuprofen 600 mg oral tablet: 1 tab(s) orally every 6 hours, As needed, Mild Pain (1 - 3)

## 2021-08-08 NOTE — DISCHARGE NOTE PROVIDER - NSDCFUADDINST_GEN_ALL_CORE_FT
Nothing inside the vagina for 6 weeks. No tampons, douching, sexual intercourse. No hot tubs or swimming pools for 6 weeks.  No heavy lifting for 6 weeks.

## 2021-08-08 NOTE — PROGRESS NOTE ADULT - ATTENDING COMMENTS
IMP: s/p TAHBSO - POD # 2 - Doing Well    Plan: dc home, NSAID's/Tylenol, f/u office - 1 wk - wound check
IMP: s/p PILY - POD # 1 - Doing Well    Plan: dc garcia, OOB/Ambulation, Pain management, Regular Diet, f/u labs

## 2021-08-08 NOTE — DISCHARGE NOTE PROVIDER - CARE PROVIDER_API CALL
Yordy Gomes)  Obstetrics and Gynecology  [address] [place] [address]  Phone: [phone]  Fax: [phone]  Established Patient  Follow Up Time: 1 week

## 2021-08-08 NOTE — DISCHARGE NOTE PROVIDER - NSDCCPTREATMENT_GEN_ALL_CORE_FT
PRINCIPAL PROCEDURE  Procedure: Hysterectomy, total, abdominal, with BSO  Findings and Treatment:

## 2021-08-08 NOTE — PROGRESS NOTE ADULT - SUBJECTIVE AND OBJECTIVE BOX
PGY 3 Note    Patient examined at bedside. Pain  1/10 on abdomen near incision site. Denies fevers/chills, HA/N/V, CP/SOB/palpitations, heavy vaginal bleeding, hematuria/dysuria, constipation/diarrhea. Tolerating regular diet, passing flatus. Ambulating without difficulty. Patient would like to go home today.     T(F): 99.2 (08-08-21 @ 09:00), Max: 99.9 (08-08-21 @ 05:00)  HR: 92 (08-08-21 @ 09:00) (69 - 97)  BP: 115/64 (08-08-21 @ 09:00) (106/55 - 151/78)  RR: 16 (08-08-21 @ 09:00) (16 - 18)  SpO2: 97% (08-08-21 @ 09:00) (86% - 100%)    Physical Exam:  General: AAOx3. NAD  CVS: RRR. Nl S1S2  Lungs: CTAB  Abdomen: soft, non-tender, non-distended, +BSx4  Incision: C/D/I  VE: deferred, no bleeding on pad  Ext: No edema. SCDs in place    Labs:             10.9<L>  10.48 )-----------( 276      ( 08-07 @ 04:58 )             34.8<L>     08-07    134<L>  |  98  |  13  ----------------------------<  99  4.5   |  22  |  0.7    Ca    8.8      07 Aug 2021 17:12  Phos  4.5     08-07  Mg     2.0     08-07    TPro  6.0  /  Alb  3.8  /  TBili  1.4<H>  /  DBili  x   /  AST  24  /  ALT  14  /  AlkPhos  51  08-07            Trend:             10.9<L>  10.48 )-----------( 276      ( 08-07 @ 04:58 )             34.8<L>        Creatinine, Serum: 0.7 (08-07)  Creatinine, Serum: 0.7 (08-07)      Medications:  MEDICATIONS  (STANDING):  acetaminophen   Tablet .. 1000 milliGRAM(s) Oral every 6 hours  enoxaparin Injectable 40 milliGRAM(s) SubCutaneous daily  gabapentin 300 milliGRAM(s) Oral every 8 hours    MEDICATIONS  (PRN):  acetaminophen   Tablet .. 1000 milliGRAM(s) Oral every 6 hours PRN Mild Pain (1 - 3)  ibuprofen  Tablet. 600 milliGRAM(s) Oral every 6 hours PRN Mild Pain (1 - 3)  ondansetron    Tablet 8 milliGRAM(s) Oral every 8 hours PRN Nausea and/or Vomiting  senna 1 Tablet(s) Oral at bedtime PRN Constipation  simethicone 80 milliGRAM(s) Chew every 6 hours PRN Gas

## 2021-08-09 LAB — NON-GYNECOLOGICAL CYTOLOGY STUDY: SIGNIFICANT CHANGE UP

## 2021-08-13 LAB — SURGICAL PATHOLOGY STUDY: SIGNIFICANT CHANGE UP

## 2021-08-20 DIAGNOSIS — N83.202 UNSPECIFIED OVARIAN CYST, LEFT SIDE: ICD-10-CM

## 2021-08-20 DIAGNOSIS — K66.0 PERITONEAL ADHESIONS (POSTPROCEDURAL) (POSTINFECTION): ICD-10-CM

## 2021-08-20 DIAGNOSIS — Z87.891 PERSONAL HISTORY OF NICOTINE DEPENDENCE: ICD-10-CM

## 2021-08-20 DIAGNOSIS — K66.8 OTHER SPECIFIED DISORDERS OF PERITONEUM: ICD-10-CM

## 2021-08-20 DIAGNOSIS — N80.1 ENDOMETRIOSIS OF OVARY: ICD-10-CM

## 2021-11-08 NOTE — ASU PREOP CHECKLIST - NOTHING BY MOUTH SINCE
Group Topic:  Process Group     Date: 11/8/2021  Start Time: 1300  End Time: 1400  Facilitators: Coby Alvarez LCSW    Focus: Process Group  Number in attendance: 1  The patient were encouraged to share the events that led up to hospitalization and exploring alternative ways to cope.       Method: Group  Attendance: The patient was offered but did not attend the group. Coby Alvarez LCSW [facility]   05-Aug-2021 19:00

## 2024-03-14 NOTE — H&P PST ADULT - NSICDXPASTSURGICALHX_GEN_ALL_CORE_FT
Pt arrives with EMS with reports of fever, shortness of breath, and increased confusion tonight.  Pt has been at [facility] for cellulitis, and weakness.  Pt has baseline dementia, staff states pt is more \"confused\" than normal this evening.    
PAST SURGICAL HISTORY:  No significant past surgical history

## 2024-09-14 ENCOUNTER — NON-APPOINTMENT (OUTPATIENT)
Age: 64
End: 2024-09-14

## 2025-06-27 ENCOUNTER — APPOINTMENT (OUTPATIENT)
Dept: OBGYN | Facility: CLINIC | Age: 65
End: 2025-06-27
Payer: COMMERCIAL

## 2025-06-27 VITALS
HEIGHT: 67 IN | SYSTOLIC BLOOD PRESSURE: 144 MMHG | BODY MASS INDEX: 25.43 KG/M2 | WEIGHT: 162 LBS | DIASTOLIC BLOOD PRESSURE: 84 MMHG

## 2025-06-27 PROCEDURE — 99459 PELVIC EXAMINATION: CPT | Mod: NC

## 2025-06-27 PROCEDURE — 99386 PREV VISIT NEW AGE 40-64: CPT | Mod: 25

## 2025-06-27 RX ORDER — CLOTRIMAZOLE AND BETAMETHASONE DIPROPIONATE 10; .5 MG/G; MG/G
1-0.05 CREAM TOPICAL TWICE DAILY
Qty: 1 | Refills: 0 | Status: ACTIVE | COMMUNITY
Start: 2025-06-27 | End: 1900-01-01

## 2025-07-09 ENCOUNTER — NON-APPOINTMENT (OUTPATIENT)
Age: 65
End: 2025-07-09